# Patient Record
Sex: FEMALE | Race: WHITE | NOT HISPANIC OR LATINO | Employment: FULL TIME | URBAN - METROPOLITAN AREA
[De-identification: names, ages, dates, MRNs, and addresses within clinical notes are randomized per-mention and may not be internally consistent; named-entity substitution may affect disease eponyms.]

---

## 2017-02-15 ENCOUNTER — HOSPITAL ENCOUNTER (OUTPATIENT)
Dept: FAMILY MEDICINE CLINIC | Facility: CLINIC | Age: 52
Setting detail: SPECIMEN
Discharge: HOME | End: 2017-02-15
Attending: NURSE PRACTITIONER | Admitting: NURSE PRACTITIONER

## 2017-02-16 ENCOUNTER — HOSPITAL ENCOUNTER (OUTPATIENT)
Dept: FAMILY MEDICINE CLINIC | Facility: CLINIC | Age: 52
Setting detail: SPECIMEN
Discharge: HOME | End: 2017-02-16
Attending: NURSE PRACTITIONER | Admitting: NURSE PRACTITIONER

## 2017-02-16 LAB
ALBUMIN SERPL-MCNC: 3.8 G/DL (ref 3.5–4.8)
ALBUMIN/GLOB SERPL: 1.3 {RATIO} (ref 1–1.7)
ALP SERPL-CCNC: 86 IU/L (ref 32–91)
ALT SERPL-CCNC: 18 IU/L (ref 14–54)
ANION GAP SERPL CALC-SCNC: 15.5 MMOL/L (ref 10–20)
AST SERPL-CCNC: 18 IU/L (ref 15–41)
BASOPHILS # BLD AUTO: 0 10*3/UL (ref 0–0.2)
BASOPHILS NFR BLD AUTO: 0 % (ref 0–2)
BILIRUB SERPL-MCNC: 0.3 MG/DL (ref 0.3–1.2)
BUN SERPL-MCNC: 15 MG/DL (ref 8–20)
BUN/CREAT SERPL: 16.7 (ref 5.4–26.2)
CALCIUM SERPL-MCNC: 9.3 MG/DL (ref 8.9–10.3)
CHLORIDE SERPL-SCNC: 108 MMOL/L (ref 101–111)
CONV CO2: 25 MMOL/L (ref 22–32)
CONV TOTAL PROTEIN: 6.8 G/DL (ref 6.1–7.9)
CREAT UR-MCNC: 0.9 MG/DL (ref 0.4–1)
DIFFERENTIAL METHOD BLD: (no result)
EOSINOPHIL # BLD AUTO: 0.2 10*3/UL (ref 0–0.3)
EOSINOPHIL # BLD AUTO: 2 % (ref 0–3)
ERYTHROCYTE [DISTWIDTH] IN BLOOD BY AUTOMATED COUNT: 14.1 % (ref 11.5–14.5)
GLOBULIN UR ELPH-MCNC: 3 G/DL (ref 2.5–3.8)
GLUCOSE SERPL-MCNC: 98 MG/DL (ref 65–99)
HCT VFR BLD AUTO: 41.9 % (ref 35–49)
HGB BLD-MCNC: 14.2 G/DL (ref 12–15)
LYMPHOCYTES # BLD AUTO: 3.9 10*3/UL (ref 0.8–4.8)
LYMPHOCYTES NFR BLD AUTO: 35 % (ref 18–42)
MCH RBC QN AUTO: 31.2 PG (ref 26–32)
MCHC RBC AUTO-ENTMCNC: 33.9 G/DL (ref 32–36)
MCV RBC AUTO: 91.9 FL (ref 80–94)
MONOCYTES # BLD AUTO: 0.5 10*3/UL (ref 0.1–1.3)
MONOCYTES NFR BLD AUTO: 5 % (ref 2–11)
NEUTROPHILS # BLD AUTO: 6.5 10*3/UL (ref 2.3–8.6)
NEUTROPHILS NFR BLD AUTO: 58 % (ref 50–75)
NRBC BLD AUTO-RTO: 0 /100{WBCS}
NRBC/RBC NFR BLD MANUAL: 0 10*3/UL
PLATELET # BLD AUTO: 298 10*3/UL (ref 150–450)
PMV BLD AUTO: 8.3 FL (ref 7.4–10.4)
POTASSIUM SERPL-SCNC: 4.5 MMOL/L (ref 3.6–5.1)
RBC # BLD AUTO: 4.57 10*6/UL (ref 4–5.4)
SODIUM SERPL-SCNC: 144 MMOL/L (ref 136–144)
WBC # BLD AUTO: 11.1 10*3/UL (ref 4.5–11.5)

## 2017-05-16 ENCOUNTER — HOSPITAL ENCOUNTER (OUTPATIENT)
Dept: FAMILY MEDICINE CLINIC | Facility: CLINIC | Age: 52
Setting detail: SPECIMEN
Discharge: HOME | End: 2017-05-16
Attending: NURSE PRACTITIONER | Admitting: NURSE PRACTITIONER

## 2017-05-16 LAB
CHOLEST SERPL-MCNC: 131 MG/DL
CHOLEST/HDLC SERPL: 2.9 {RATIO}
CONV LDL CHOLESTEROL DIRECT: 72 MG/DL (ref 0–100)
HDLC SERPL-MCNC: 45 MG/DL
LDLC/HDLC SERPL: 1.6 {RATIO}
LIPID INTERPRETATION: NORMAL
TRIGL SERPL-MCNC: 56 MG/DL
VLDLC SERPL CALC-MCNC: 14.7 MG/DL

## 2017-09-29 ENCOUNTER — APPOINTMENT (OUTPATIENT)
Dept: GENERAL RADIOLOGY | Facility: HOSPITAL | Age: 52
End: 2017-09-29

## 2017-09-29 ENCOUNTER — HOSPITAL ENCOUNTER (INPATIENT)
Facility: HOSPITAL | Age: 52
LOS: 3 days | Discharge: HOME | End: 2017-10-02
Attending: FAMILY MEDICINE | Admitting: HOSPITALIST

## 2017-09-29 ENCOUNTER — APPOINTMENT (OUTPATIENT)
Dept: CT IMAGING | Facility: HOSPITAL | Age: 52
End: 2017-09-29

## 2017-09-29 ENCOUNTER — OFFICE VISIT (OUTPATIENT)
Dept: RETAIL CLINIC | Facility: CLINIC | Age: 52
End: 2017-09-29

## 2017-09-29 VITALS — TEMPERATURE: 98.8 F

## 2017-09-29 DIAGNOSIS — G35 MULTIPLE SCLEROSIS (HCC): Primary | ICD-10-CM

## 2017-09-29 DIAGNOSIS — R26.89 BALANCE PROBLEM: ICD-10-CM

## 2017-09-29 DIAGNOSIS — R27.0 ATAXIA: Primary | ICD-10-CM

## 2017-09-29 DIAGNOSIS — R13.12 OROPHARYNGEAL DYSPHAGIA: ICD-10-CM

## 2017-09-29 PROBLEM — R73.9 HYPERGLYCEMIA: Status: ACTIVE | Noted: 2017-09-29

## 2017-09-29 PROBLEM — G47.33 OSA (OBSTRUCTIVE SLEEP APNEA): Status: ACTIVE | Noted: 2017-09-29

## 2017-09-29 PROBLEM — E78.5 HYPERLIPIDEMIA: Status: ACTIVE | Noted: 2017-09-29

## 2017-09-29 LAB
ALBUMIN SERPL-MCNC: 4.5 G/DL (ref 3.5–5.2)
ALBUMIN/GLOB SERPL: 1.3 G/DL
ALP SERPL-CCNC: 85 U/L (ref 39–117)
ALT SERPL W P-5'-P-CCNC: 13 U/L (ref 1–33)
ANION GAP SERPL CALCULATED.3IONS-SCNC: 14.4 MMOL/L
AST SERPL-CCNC: 13 U/L (ref 1–32)
BACTERIA UR QL AUTO: ABNORMAL /HPF
BASOPHILS # BLD AUTO: 0.02 10*3/MM3 (ref 0–0.2)
BASOPHILS NFR BLD AUTO: 0.1 % (ref 0–1.5)
BILIRUB SERPL-MCNC: 0.5 MG/DL (ref 0.1–1.2)
BILIRUB UR QL STRIP: NEGATIVE
BUN BLD-MCNC: 7 MG/DL (ref 6–20)
BUN/CREAT SERPL: 8.9 (ref 7–25)
CALCIUM SPEC-SCNC: 9.9 MG/DL (ref 8.6–10.5)
CHLORIDE SERPL-SCNC: 102 MMOL/L (ref 98–107)
CLARITY UR: CLEAR
CO2 SERPL-SCNC: 23.6 MMOL/L (ref 22–29)
COLOR UR: YELLOW
CREAT BLD-MCNC: 0.79 MG/DL (ref 0.57–1)
DEPRECATED RDW RBC AUTO: 45.8 FL (ref 37–54)
EOSINOPHIL # BLD AUTO: 0.02 10*3/MM3 (ref 0–0.7)
EOSINOPHIL NFR BLD AUTO: 0.1 % (ref 0.3–6.2)
ERYTHROCYTE [DISTWIDTH] IN BLOOD BY AUTOMATED COUNT: 13.7 % (ref 11.7–13)
GFR SERPL CREATININE-BSD FRML MDRD: 76 ML/MIN/1.73
GLOBULIN UR ELPH-MCNC: 3.4 GM/DL
GLUCOSE BLD-MCNC: 138 MG/DL (ref 65–99)
GLUCOSE UR STRIP-MCNC: NEGATIVE MG/DL
HCT VFR BLD AUTO: 44.7 % (ref 35.6–45.5)
HGB BLD-MCNC: 15.2 G/DL (ref 11.9–15.5)
HGB UR QL STRIP.AUTO: ABNORMAL
HOLD SPECIMEN: NORMAL
HOLD SPECIMEN: NORMAL
HYALINE CASTS UR QL AUTO: ABNORMAL /LPF
IMM GRANULOCYTES # BLD: 0.03 10*3/MM3 (ref 0–0.03)
IMM GRANULOCYTES NFR BLD: 0.2 % (ref 0–0.5)
KETONES UR QL STRIP: NEGATIVE
LEUKOCYTE ESTERASE UR QL STRIP.AUTO: NEGATIVE
LYMPHOCYTES # BLD AUTO: 2.16 10*3/MM3 (ref 0.9–4.8)
LYMPHOCYTES NFR BLD AUTO: 15.9 % (ref 19.6–45.3)
MAGNESIUM SERPL-MCNC: 2.4 MG/DL (ref 1.6–2.6)
MCH RBC QN AUTO: 31.2 PG (ref 26.9–32)
MCHC RBC AUTO-ENTMCNC: 34 G/DL (ref 32.4–36.3)
MCV RBC AUTO: 91.8 FL (ref 80.5–98.2)
MONOCYTES # BLD AUTO: 0.31 10*3/MM3 (ref 0.2–1.2)
MONOCYTES NFR BLD AUTO: 2.3 % (ref 5–12)
NEUTROPHILS # BLD AUTO: 11.07 10*3/MM3 (ref 1.9–8.1)
NEUTROPHILS NFR BLD AUTO: 81.4 % (ref 42.7–76)
NITRITE UR QL STRIP: NEGATIVE
PH UR STRIP.AUTO: 5.5 [PH] (ref 5–8)
PLATELET # BLD AUTO: 326 10*3/MM3 (ref 140–500)
PMV BLD AUTO: 9.9 FL (ref 6–12)
POTASSIUM BLD-SCNC: 4.6 MMOL/L (ref 3.5–5.2)
PROT SERPL-MCNC: 7.9 G/DL (ref 6–8.5)
PROT UR QL STRIP: NEGATIVE
RBC # BLD AUTO: 4.87 10*6/MM3 (ref 3.9–5.2)
RBC # UR: ABNORMAL /HPF
REF LAB TEST METHOD: ABNORMAL
SODIUM BLD-SCNC: 140 MMOL/L (ref 136–145)
SP GR UR STRIP: 1.01 (ref 1–1.03)
SQUAMOUS #/AREA URNS HPF: ABNORMAL /HPF
TROPONIN T SERPL-MCNC: <0.01 NG/ML (ref 0–0.03)
UROBILINOGEN UR QL STRIP: ABNORMAL
WBC NRBC COR # BLD: 13.61 10*3/MM3 (ref 4.5–10.7)
WBC UR QL AUTO: ABNORMAL /HPF
WHOLE BLOOD HOLD SPECIMEN: NORMAL
WHOLE BLOOD HOLD SPECIMEN: NORMAL

## 2017-09-29 PROCEDURE — 83735 ASSAY OF MAGNESIUM: CPT | Performed by: FAMILY MEDICINE

## 2017-09-29 PROCEDURE — 36415 COLL VENOUS BLD VENIPUNCTURE: CPT | Performed by: FAMILY MEDICINE

## 2017-09-29 PROCEDURE — 93005 ELECTROCARDIOGRAM TRACING: CPT

## 2017-09-29 PROCEDURE — 80053 COMPREHEN METABOLIC PANEL: CPT | Performed by: FAMILY MEDICINE

## 2017-09-29 PROCEDURE — 93005 ELECTROCARDIOGRAM TRACING: CPT | Performed by: FAMILY MEDICINE

## 2017-09-29 PROCEDURE — 99213 OFFICE O/P EST LOW 20 MIN: CPT | Performed by: NURSE PRACTITIONER

## 2017-09-29 PROCEDURE — 99283 EMERGENCY DEPT VISIT LOW MDM: CPT

## 2017-09-29 PROCEDURE — 93010 ELECTROCARDIOGRAM REPORT: CPT | Performed by: INTERNAL MEDICINE

## 2017-09-29 PROCEDURE — 70450 CT HEAD/BRAIN W/O DYE: CPT

## 2017-09-29 PROCEDURE — 84484 ASSAY OF TROPONIN QUANT: CPT | Performed by: FAMILY MEDICINE

## 2017-09-29 PROCEDURE — 82607 VITAMIN B-12: CPT | Performed by: PSYCHIATRY & NEUROLOGY

## 2017-09-29 PROCEDURE — 81001 URINALYSIS AUTO W/SCOPE: CPT | Performed by: FAMILY MEDICINE

## 2017-09-29 PROCEDURE — 85025 COMPLETE CBC W/AUTO DIFF WBC: CPT | Performed by: FAMILY MEDICINE

## 2017-09-29 RX ORDER — ACETAMINOPHEN 325 MG/1
650 TABLET ORAL EVERY 4 HOURS PRN
Status: DISCONTINUED | OUTPATIENT
Start: 2017-09-29 | End: 2017-10-02 | Stop reason: HOSPADM

## 2017-09-29 RX ORDER — ASPIRIN 325 MG
325 TABLET ORAL ONCE
Status: DISCONTINUED | OUTPATIENT
Start: 2017-09-29 | End: 2017-09-30

## 2017-09-29 RX ORDER — NICOTINE POLACRILEX 4 MG
15 LOZENGE BUCCAL
Status: DISCONTINUED | OUTPATIENT
Start: 2017-09-29 | End: 2017-10-02 | Stop reason: HOSPADM

## 2017-09-29 RX ORDER — CHOLECALCIFEROL (VITAMIN D3) 125 MCG
500 CAPSULE ORAL DAILY
Status: DISCONTINUED | OUTPATIENT
Start: 2017-09-30 | End: 2017-10-02 | Stop reason: HOSPADM

## 2017-09-29 RX ORDER — MELATONIN
5000 DAILY
Status: DISCONTINUED | OUTPATIENT
Start: 2017-09-30 | End: 2017-10-02 | Stop reason: HOSPADM

## 2017-09-29 RX ORDER — DEXTROSE MONOHYDRATE 25 G/50ML
25 INJECTION, SOLUTION INTRAVENOUS
Status: DISCONTINUED | OUTPATIENT
Start: 2017-09-29 | End: 2017-10-02 | Stop reason: HOSPADM

## 2017-09-29 RX ORDER — ATORVASTATIN CALCIUM 80 MG/1
80 TABLET, FILM COATED ORAL NIGHTLY
Status: DISCONTINUED | OUTPATIENT
Start: 2017-09-30 | End: 2017-10-02 | Stop reason: HOSPADM

## 2017-09-29 RX ORDER — SODIUM CHLORIDE 0.9 % (FLUSH) 0.9 %
10 SYRINGE (ML) INJECTION AS NEEDED
Status: DISCONTINUED | OUTPATIENT
Start: 2017-09-29 | End: 2017-10-02 | Stop reason: HOSPADM

## 2017-09-29 RX ORDER — ONDANSETRON 4 MG/1
4 TABLET, FILM COATED ORAL EVERY 6 HOURS PRN
Status: DISCONTINUED | OUTPATIENT
Start: 2017-09-29 | End: 2017-10-02 | Stop reason: HOSPADM

## 2017-09-29 RX ORDER — ONDANSETRON 4 MG/1
4 TABLET, ORALLY DISINTEGRATING ORAL EVERY 6 HOURS PRN
Status: DISCONTINUED | OUTPATIENT
Start: 2017-09-29 | End: 2017-10-02 | Stop reason: HOSPADM

## 2017-09-29 RX ORDER — CHOLECALCIFEROL (VITAMIN D3) 125 MCG
500 CAPSULE ORAL DAILY
COMMUNITY

## 2017-09-29 RX ORDER — SODIUM CHLORIDE 9 MG/ML
75 INJECTION, SOLUTION INTRAVENOUS CONTINUOUS
Status: DISCONTINUED | OUTPATIENT
Start: 2017-09-30 | End: 2017-10-02 | Stop reason: HOSPADM

## 2017-09-29 RX ORDER — ASPIRIN 81 MG/1
81 TABLET, CHEWABLE ORAL DAILY
COMMUNITY
End: 2017-09-29 | Stop reason: CLARIF

## 2017-09-29 RX ORDER — ATORVASTATIN CALCIUM 10 MG/1
10 TABLET, FILM COATED ORAL NIGHTLY
COMMUNITY

## 2017-09-29 RX ORDER — SODIUM CHLORIDE 0.9 % (FLUSH) 0.9 %
1-10 SYRINGE (ML) INJECTION AS NEEDED
Status: DISCONTINUED | OUTPATIENT
Start: 2017-09-29 | End: 2017-10-02 | Stop reason: HOSPADM

## 2017-09-29 RX ORDER — ASPIRIN 325 MG
325 TABLET, DELAYED RELEASE (ENTERIC COATED) ORAL DAILY
Status: DISCONTINUED | OUTPATIENT
Start: 2017-09-30 | End: 2017-09-30

## 2017-09-29 RX ORDER — MELATONIN
5000 DAILY
COMMUNITY

## 2017-09-29 RX ORDER — ONDANSETRON 2 MG/ML
4 INJECTION INTRAMUSCULAR; INTRAVENOUS EVERY 6 HOURS PRN
Status: DISCONTINUED | OUTPATIENT
Start: 2017-09-29 | End: 2017-10-02 | Stop reason: HOSPADM

## 2017-09-29 RX ORDER — ASPIRIN 81 MG/1
81 TABLET ORAL DAILY
COMMUNITY

## 2017-09-29 NOTE — PROGRESS NOTES
Subjective   Patient ID: Maggie Goyal is a 52 y.o. female presents with   Chief Complaint   Patient presents with   • Nausea   • Dizziness       HPI Comments: 53 yo wf  Cc: PMH: MS x 2 years. She is followed by neuro but does not have f/u appt.    She began having sudden onset of feeling off balance and leaning to left side. She has assoc nausea, vomiting and diarrhea x 24h. No LOC or falls reported. Denies speech or vision problems. She has taken asa daily.  She is not kadeem food or fluids today.    Nausea   Associated symptoms include nausea. Pertinent negatives include no abdominal pain, fatigue, fever, headaches, numbness or weakness.   Dizziness   Associated symptoms include nausea. Pertinent negatives include no abdominal pain, fatigue, fever, headaches, numbness or weakness.       Allergies not on file    The following portions of the patient's history were reviewed and updated as appropriate: allergies, current medications, past family history, past medical history, past social history, past surgical history and problem list.      Review of Systems   Constitutional: Positive for activity change. Negative for fatigue and fever.   Eyes: Negative for visual disturbance.   Respiratory: Negative for shortness of breath.    Gastrointestinal: Positive for diarrhea and nausea. Negative for abdominal pain.   Neurological: Positive for dizziness. Negative for syncope, facial asymmetry, speech difficulty, weakness, light-headedness, numbness and headaches.       Objective     Vitals:    09/29/17 1240   Temp: 98.8 °F (37.1 °C)         Physical Exam   Constitutional: She is oriented to person, place, and time. She appears well-developed and well-nourished. No distress.   HENT:   Head: Normocephalic and atraumatic.   Eyes: Conjunctivae and EOM are normal. Pupils are equal, round, and reactive to light.   Neck: Normal range of motion. Neck supple.   Cardiovascular: Normal rate, regular rhythm, normal heart sounds and  intact distal pulses.    Pulmonary/Chest: Effort normal and breath sounds normal.   Abdominal: Soft. Bowel sounds are normal. She exhibits no distension. There is no tenderness.   Musculoskeletal: Normal range of motion.   Neurological: She is alert and oriented to person, place, and time. She has normal reflexes. She displays normal reflexes. No cranial nerve deficit. She exhibits normal muscle tone. Coordination (gait steady but Rhomberg positive . pt is unable to maintain posture and falls to left with repeated  attempts) abnormal.   Motor is 5/5 all extremities. Speech is clear.    Skin: Skin is warm and dry.   Psychiatric: She has a normal mood and affect. Her behavior is normal. Judgment and thought content normal.   Nursing note and vitals reviewed.        Maggie was seen today for nausea and dizziness.    Diagnoses and all orders for this visit:    Multiple sclerosis    I have advised her to proceed by private vehicle to ER for further eval of her symptoms.  She is stable to travel in the car of a friend.     Follow-up with Primary Care Physician in 24-48 hours if these symptoms worsen or fail to improve as anticipated.

## 2017-09-30 ENCOUNTER — APPOINTMENT (OUTPATIENT)
Dept: MRI IMAGING | Facility: HOSPITAL | Age: 52
End: 2017-09-30

## 2017-09-30 ENCOUNTER — APPOINTMENT (OUTPATIENT)
Dept: CARDIOLOGY | Facility: HOSPITAL | Age: 52
End: 2017-09-30
Attending: HOSPITALIST

## 2017-09-30 LAB
ANION GAP SERPL CALCULATED.3IONS-SCNC: 15.7 MMOL/L
BH CV ECHO MEAS - ACS: 1.9 CM
BH CV ECHO MEAS - AO MAX PG (FULL): 3 MMHG
BH CV ECHO MEAS - AO MAX PG: 6.5 MMHG
BH CV ECHO MEAS - AO MEAN PG (FULL): 1 MMHG
BH CV ECHO MEAS - AO MEAN PG: 3 MMHG
BH CV ECHO MEAS - AO ROOT AREA (BSA CORRECTED): 1.7
BH CV ECHO MEAS - AO ROOT AREA: 5.7 CM^2
BH CV ECHO MEAS - AO ROOT DIAM: 2.7 CM
BH CV ECHO MEAS - AO V2 MAX: 127 CM/SEC
BH CV ECHO MEAS - AO V2 MEAN: 82.7 CM/SEC
BH CV ECHO MEAS - AO V2 VTI: 31.7 CM
BH CV ECHO MEAS - AVA(I,A): 2 CM^2
BH CV ECHO MEAS - AVA(I,D): 2 CM^2
BH CV ECHO MEAS - AVA(V,A): 2.1 CM^2
BH CV ECHO MEAS - AVA(V,D): 2.1 CM^2
BH CV ECHO MEAS - BSA(HAYCOCK): 1.6 M^2
BH CV ECHO MEAS - BSA: 1.6 M^2
BH CV ECHO MEAS - BZI_BMI: 24.2 KILOGRAMS/M^2
BH CV ECHO MEAS - BZI_METRIC_HEIGHT: 154.9 CM
BH CV ECHO MEAS - BZI_METRIC_WEIGHT: 58.1 KG
BH CV ECHO MEAS - CONTRAST EF (2CH): 67.3 ML/M^2
BH CV ECHO MEAS - CONTRAST EF 4CH: 57.9 ML/M^2
BH CV ECHO MEAS - EDV(CUBED): 68.9 ML
BH CV ECHO MEAS - EDV(MOD-SP2): 52 ML
BH CV ECHO MEAS - EDV(MOD-SP4): 38 ML
BH CV ECHO MEAS - EDV(TEICH): 74.2 ML
BH CV ECHO MEAS - EF(CUBED): 79.9 %
BH CV ECHO MEAS - EF(MOD-SP2): 67.3 %
BH CV ECHO MEAS - EF(MOD-SP4): 57.9 %
BH CV ECHO MEAS - EF(TEICH): 72.8 %
BH CV ECHO MEAS - ESV(CUBED): 13.8 ML
BH CV ECHO MEAS - ESV(MOD-SP2): 17 ML
BH CV ECHO MEAS - ESV(MOD-SP4): 16 ML
BH CV ECHO MEAS - ESV(TEICH): 20.2 ML
BH CV ECHO MEAS - FS: 41.5 %
BH CV ECHO MEAS - IVS/LVPW: 1
BH CV ECHO MEAS - IVSD: 1.1 CM
BH CV ECHO MEAS - LAT PEAK E' VEL: 12 CM/SEC
BH CV ECHO MEAS - LV DIASTOLIC VOL/BSA (35-75): 24.3 ML/M^2
BH CV ECHO MEAS - LV MASS(C)D: 151.3 GRAMS
BH CV ECHO MEAS - LV MASS(C)DI: 96.8 GRAMS/M^2
BH CV ECHO MEAS - LV MAX PG: 3.4 MMHG
BH CV ECHO MEAS - LV MEAN PG: 2 MMHG
BH CV ECHO MEAS - LV SYSTOLIC VOL/BSA (12-30): 10.2 ML/M^2
BH CV ECHO MEAS - LV V1 MAX: 92.4 CM/SEC
BH CV ECHO MEAS - LV V1 MEAN: 59.9 CM/SEC
BH CV ECHO MEAS - LV V1 VTI: 22.6 CM
BH CV ECHO MEAS - LVIDD: 4.1 CM
BH CV ECHO MEAS - LVIDS: 2.4 CM
BH CV ECHO MEAS - LVLD AP2: 7.1 CM
BH CV ECHO MEAS - LVLD AP4: 6 CM
BH CV ECHO MEAS - LVLS AP2: 5.7 CM
BH CV ECHO MEAS - LVLS AP4: 5 CM
BH CV ECHO MEAS - LVOT AREA (M): 2.8 CM^2
BH CV ECHO MEAS - LVOT AREA: 2.8 CM^2
BH CV ECHO MEAS - LVOT DIAM: 1.9 CM
BH CV ECHO MEAS - LVPWD: 1.1 CM
BH CV ECHO MEAS - MED PEAK E' VEL: 9 CM/SEC
BH CV ECHO MEAS - MV A DUR: 0.11 SEC
BH CV ECHO MEAS - MV A MAX VEL: 94.1 CM/SEC
BH CV ECHO MEAS - MV DEC SLOPE: 475 CM/SEC^2
BH CV ECHO MEAS - MV DEC TIME: 0.22 SEC
BH CV ECHO MEAS - MV E MAX VEL: 98 CM/SEC
BH CV ECHO MEAS - MV E/A: 1
BH CV ECHO MEAS - MV MAX PG: 6.4 MMHG
BH CV ECHO MEAS - MV MEAN PG: 2 MMHG
BH CV ECHO MEAS - MV P1/2T MAX VEL: 134 CM/SEC
BH CV ECHO MEAS - MV P1/2T: 82.6 MSEC
BH CV ECHO MEAS - MV V2 MAX: 126 CM/SEC
BH CV ECHO MEAS - MV V2 MEAN: 59 CM/SEC
BH CV ECHO MEAS - MV V2 VTI: 40 CM
BH CV ECHO MEAS - MVA P1/2T LCG: 1.6 CM^2
BH CV ECHO MEAS - MVA(P1/2T): 2.7 CM^2
BH CV ECHO MEAS - MVA(VTI): 1.6 CM^2
BH CV ECHO MEAS - PA ACC TIME: 0.14 SEC
BH CV ECHO MEAS - PA MAX PG (FULL): 1.7 MMHG
BH CV ECHO MEAS - PA MAX PG: 3.6 MMHG
BH CV ECHO MEAS - PA PR(ACCEL): 14.2 MMHG
BH CV ECHO MEAS - PA V2 MAX: 94.5 CM/SEC
BH CV ECHO MEAS - PULM A REVS DUR: 0.13 SEC
BH CV ECHO MEAS - PULM A REVS VEL: 28.6 CM/SEC
BH CV ECHO MEAS - PULM DIAS VEL: 47.1 CM/SEC
BH CV ECHO MEAS - PULM S/D: 1.4
BH CV ECHO MEAS - PULM SYS VEL: 67.9 CM/SEC
BH CV ECHO MEAS - PVA(V,A): 3 CM^2
BH CV ECHO MEAS - PVA(V,D): 3 CM^2
BH CV ECHO MEAS - QP/QS: 1.1
BH CV ECHO MEAS - RAP SYSTOLE: 8 MMHG
BH CV ECHO MEAS - RV MAX PG: 1.9 MMHG
BH CV ECHO MEAS - RV MEAN PG: 1 MMHG
BH CV ECHO MEAS - RV V1 MAX: 68.7 CM/SEC
BH CV ECHO MEAS - RV V1 MEAN: 46.4 CM/SEC
BH CV ECHO MEAS - RV V1 VTI: 17.7 CM
BH CV ECHO MEAS - RVOT AREA: 4.2 CM^2
BH CV ECHO MEAS - RVOT DIAM: 2.3 CM
BH CV ECHO MEAS - RVSP: 32 MMHG
BH CV ECHO MEAS - SI(AO): 116.1 ML/M^2
BH CV ECHO MEAS - SI(CUBED): 35.3 ML/M^2
BH CV ECHO MEAS - SI(LVOT): 41 ML/M^2
BH CV ECHO MEAS - SI(MOD-SP2): 22.4 ML/M^2
BH CV ECHO MEAS - SI(MOD-SP4): 14.1 ML/M^2
BH CV ECHO MEAS - SI(TEICH): 34.6 ML/M^2
BH CV ECHO MEAS - SUP REN AO DIAM: 1.6 CM
BH CV ECHO MEAS - SV(AO): 181.5 ML
BH CV ECHO MEAS - SV(CUBED): 55.1 ML
BH CV ECHO MEAS - SV(LVOT): 64.1 ML
BH CV ECHO MEAS - SV(MOD-SP2): 35 ML
BH CV ECHO MEAS - SV(MOD-SP4): 22 ML
BH CV ECHO MEAS - SV(RVOT): 73.5 ML
BH CV ECHO MEAS - SV(TEICH): 54.1 ML
BH CV ECHO MEAS - TAPSE (>1.6): 2.2 CM2
BH CV ECHO MEAS - TR MAX VEL: 243 CM/SEC
BH CV VAS BP RIGHT ARM: NORMAL MMHG
BH CV XLRA - RV BASE: 2.7 CM
BH CV XLRA - TDI S': 12 CM/SEC
BILIRUB UR QL STRIP: NEGATIVE
BUN BLD-MCNC: 8 MG/DL (ref 6–20)
BUN/CREAT SERPL: 10.7 (ref 7–25)
CALCIUM SPEC-SCNC: 9.2 MG/DL (ref 8.6–10.5)
CHLORIDE SERPL-SCNC: 104 MMOL/L (ref 98–107)
CHOLEST SERPL-MCNC: 126 MG/DL (ref 0–200)
CLARITY UR: CLEAR
CO2 SERPL-SCNC: 23.3 MMOL/L (ref 22–29)
COLOR UR: YELLOW
CREAT BLD-MCNC: 0.75 MG/DL (ref 0.57–1)
DEPRECATED RDW RBC AUTO: 44.9 FL (ref 37–54)
E/E' RATIO: 9
ERYTHROCYTE [DISTWIDTH] IN BLOOD BY AUTOMATED COUNT: 13.5 % (ref 11.7–13)
GFR SERPL CREATININE-BSD FRML MDRD: 81 ML/MIN/1.73
GLUCOSE BLD-MCNC: 98 MG/DL (ref 65–99)
GLUCOSE BLDC GLUCOMTR-MCNC: 100 MG/DL (ref 70–130)
GLUCOSE BLDC GLUCOMTR-MCNC: 213 MG/DL (ref 70–130)
GLUCOSE BLDC GLUCOMTR-MCNC: 235 MG/DL (ref 70–130)
GLUCOSE UR STRIP-MCNC: ABNORMAL MG/DL
HBA1C MFR BLD: 5.78 % (ref 4.8–5.6)
HCT VFR BLD AUTO: 43.9 % (ref 35.6–45.5)
HDLC SERPL-MCNC: 43 MG/DL (ref 40–60)
HGB BLD-MCNC: 14.8 G/DL (ref 11.9–15.5)
HGB UR QL STRIP.AUTO: NEGATIVE
KETONES UR QL STRIP: NEGATIVE
LDLC SERPL CALC-MCNC: 61 MG/DL (ref 0–100)
LDLC/HDLC SERPL: 1.41 {RATIO}
LEFT ATRIUM VOLUME INDEX: 21 ML/M2
LEUKOCYTE ESTERASE UR QL STRIP.AUTO: NEGATIVE
MCH RBC QN AUTO: 30.7 PG (ref 26.9–32)
MCHC RBC AUTO-ENTMCNC: 33.7 G/DL (ref 32.4–36.3)
MCV RBC AUTO: 91.1 FL (ref 80.5–98.2)
NITRITE UR QL STRIP: NEGATIVE
PH UR STRIP.AUTO: 6 [PH] (ref 5–8)
PLATELET # BLD AUTO: 280 10*3/MM3 (ref 140–500)
PMV BLD AUTO: 9.7 FL (ref 6–12)
POTASSIUM BLD-SCNC: 4.1 MMOL/L (ref 3.5–5.2)
PROT UR QL STRIP: NEGATIVE
RBC # BLD AUTO: 4.82 10*6/MM3 (ref 3.9–5.2)
SODIUM BLD-SCNC: 143 MMOL/L (ref 136–145)
SP GR UR STRIP: 1.01 (ref 1–1.03)
TRIGL SERPL-MCNC: 111 MG/DL (ref 0–150)
TSH SERPL DL<=0.05 MIU/L-ACNC: 4.5 MIU/ML (ref 0.27–4.2)
UROBILINOGEN UR QL STRIP: ABNORMAL
VLDLC SERPL-MCNC: 22.2 MG/DL (ref 5–40)
WBC NRBC COR # BLD: 9.1 10*3/MM3 (ref 4.5–10.7)

## 2017-09-30 PROCEDURE — 70544 MR ANGIOGRAPHY HEAD W/O DYE: CPT

## 2017-09-30 PROCEDURE — 82962 GLUCOSE BLOOD TEST: CPT

## 2017-09-30 PROCEDURE — 81003 URINALYSIS AUTO W/O SCOPE: CPT | Performed by: PSYCHIATRY & NEUROLOGY

## 2017-09-30 PROCEDURE — 84443 ASSAY THYROID STIM HORMONE: CPT | Performed by: HOSPITALIST

## 2017-09-30 PROCEDURE — 97165 OT EVAL LOW COMPLEX 30 MIN: CPT

## 2017-09-30 PROCEDURE — 70549 MR ANGIOGRAPH NECK W/O&W/DYE: CPT

## 2017-09-30 PROCEDURE — 85027 COMPLETE CBC AUTOMATED: CPT | Performed by: PSYCHIATRY & NEUROLOGY

## 2017-09-30 PROCEDURE — 80061 LIPID PANEL: CPT | Performed by: HOSPITALIST

## 2017-09-30 PROCEDURE — 93306 TTE W/DOPPLER COMPLETE: CPT

## 2017-09-30 PROCEDURE — 25510000001 GADOBENATE DIMEGLUMINE 529 MG/ML SOLUTION: Performed by: INTERNAL MEDICINE

## 2017-09-30 PROCEDURE — 80048 BASIC METABOLIC PNL TOTAL CA: CPT | Performed by: HOSPITALIST

## 2017-09-30 PROCEDURE — 83036 HEMOGLOBIN GLYCOSYLATED A1C: CPT | Performed by: HOSPITALIST

## 2017-09-30 PROCEDURE — 25010000002 METHYLPREDNISOLONE: Performed by: HOSPITALIST

## 2017-09-30 PROCEDURE — A9577 INJ MULTIHANCE: HCPCS | Performed by: INTERNAL MEDICINE

## 2017-09-30 PROCEDURE — 93306 TTE W/DOPPLER COMPLETE: CPT | Performed by: INTERNAL MEDICINE

## 2017-09-30 PROCEDURE — 63710000001 INSULIN ASPART PER 5 UNITS: Performed by: HOSPITALIST

## 2017-09-30 PROCEDURE — 92610 EVALUATE SWALLOWING FUNCTION: CPT

## 2017-09-30 PROCEDURE — 99254 IP/OBS CNSLTJ NEW/EST MOD 60: CPT | Performed by: PSYCHIATRY & NEUROLOGY

## 2017-09-30 PROCEDURE — 70553 MRI BRAIN STEM W/O & W/DYE: CPT

## 2017-09-30 RX ORDER — CLOPIDOGREL BISULFATE 75 MG/1
75 TABLET ORAL DAILY
Status: DISCONTINUED | OUTPATIENT
Start: 2017-09-30 | End: 2017-10-02 | Stop reason: HOSPADM

## 2017-09-30 RX ADMIN — CLOPIDOGREL 75 MG: 75 TABLET, FILM COATED ORAL at 17:23

## 2017-09-30 RX ADMIN — ASPIRIN 325 MG: 325 TABLET, DELAYED RELEASE ORAL at 09:20

## 2017-09-30 RX ADMIN — INSULIN ASPART 4 UNITS: 100 INJECTION, SOLUTION INTRAVENOUS; SUBCUTANEOUS at 21:43

## 2017-09-30 RX ADMIN — ATORVASTATIN CALCIUM 80 MG: 80 TABLET, FILM COATED ORAL at 20:26

## 2017-09-30 RX ADMIN — METHYLPREDNISOLONE SODIUM SUCCINATE 1 G: 1 INJECTION, POWDER, FOR SOLUTION INTRAMUSCULAR; INTRAVENOUS at 09:20

## 2017-09-30 RX ADMIN — CYANOCOBALAMIN TAB 500 MCG 500 MCG: 500 TAB at 09:21

## 2017-09-30 RX ADMIN — GADOBENATE DIMEGLUMINE 11 ML: 529 INJECTION, SOLUTION INTRAVENOUS at 14:30

## 2017-09-30 RX ADMIN — SODIUM CHLORIDE 75 ML/HR: 9 INJECTION, SOLUTION INTRAVENOUS at 20:26

## 2017-09-30 RX ADMIN — VITAMIN D, TAB 1000IU (100/BT) 5000 UNITS: 25 TAB at 09:21

## 2017-09-30 RX ADMIN — SODIUM CHLORIDE 75 ML/HR: 9 INJECTION, SOLUTION INTRAVENOUS at 00:52

## 2017-09-30 RX ADMIN — INSULIN ASPART 4 UNITS: 100 INJECTION, SOLUTION INTRAVENOUS; SUBCUTANEOUS at 17:23

## 2017-10-01 LAB
ANION GAP SERPL CALCULATED.3IONS-SCNC: 12.6 MMOL/L
BASOPHILS # BLD AUTO: 0 10*3/MM3 (ref 0–0.2)
BASOPHILS NFR BLD AUTO: 0 % (ref 0–1.5)
BUN BLD-MCNC: 9 MG/DL (ref 6–20)
BUN/CREAT SERPL: 14.3 (ref 7–25)
CALCIUM SPEC-SCNC: 9.2 MG/DL (ref 8.6–10.5)
CHLORIDE SERPL-SCNC: 107 MMOL/L (ref 98–107)
CO2 SERPL-SCNC: 21.4 MMOL/L (ref 22–29)
CREAT BLD-MCNC: 0.63 MG/DL (ref 0.57–1)
DEPRECATED RDW RBC AUTO: 44.4 FL (ref 37–54)
DEPRECATED RDW RBC AUTO: 44.8 FL (ref 37–54)
EOSINOPHIL # BLD AUTO: 0 10*3/MM3 (ref 0–0.7)
EOSINOPHIL NFR BLD AUTO: 0 % (ref 0.3–6.2)
ERYTHROCYTE [DISTWIDTH] IN BLOOD BY AUTOMATED COUNT: 13.4 % (ref 11.7–13)
ERYTHROCYTE [DISTWIDTH] IN BLOOD BY AUTOMATED COUNT: 13.5 % (ref 11.7–13)
GFR SERPL CREATININE-BSD FRML MDRD: 99 ML/MIN/1.73
GLUCOSE BLD-MCNC: 145 MG/DL (ref 65–99)
GLUCOSE BLDC GLUCOMTR-MCNC: 128 MG/DL (ref 70–130)
GLUCOSE BLDC GLUCOMTR-MCNC: 193 MG/DL (ref 70–130)
GLUCOSE BLDC GLUCOMTR-MCNC: 198 MG/DL (ref 70–130)
GLUCOSE BLDC GLUCOMTR-MCNC: 255 MG/DL (ref 70–130)
HCT VFR BLD AUTO: 39.6 % (ref 35.6–45.5)
HCT VFR BLD AUTO: 40.2 % (ref 35.6–45.5)
HGB BLD-MCNC: 13.3 G/DL (ref 11.9–15.5)
HGB BLD-MCNC: 13.3 G/DL (ref 11.9–15.5)
IMM GRANULOCYTES # BLD: 0.04 10*3/MM3 (ref 0–0.03)
IMM GRANULOCYTES NFR BLD: 0.3 % (ref 0–0.5)
LYMPHOCYTES # BLD AUTO: 1.87 10*3/MM3 (ref 0.9–4.8)
LYMPHOCYTES NFR BLD AUTO: 11.8 % (ref 19.6–45.3)
MCH RBC QN AUTO: 30.5 PG (ref 26.9–32)
MCH RBC QN AUTO: 31 PG (ref 26.9–32)
MCHC RBC AUTO-ENTMCNC: 33.1 G/DL (ref 32.4–36.3)
MCHC RBC AUTO-ENTMCNC: 33.6 G/DL (ref 32.4–36.3)
MCV RBC AUTO: 92.2 FL (ref 80.5–98.2)
MCV RBC AUTO: 92.3 FL (ref 80.5–98.2)
MONOCYTES # BLD AUTO: 0.22 10*3/MM3 (ref 0.2–1.2)
MONOCYTES NFR BLD AUTO: 1.4 % (ref 5–12)
NEUTROPHILS # BLD AUTO: 13.72 10*3/MM3 (ref 1.9–8.1)
NEUTROPHILS NFR BLD AUTO: 86.5 % (ref 42.7–76)
PLATELET # BLD AUTO: 251 10*3/MM3 (ref 140–500)
PLATELET # BLD AUTO: 252 10*3/MM3 (ref 140–500)
PMV BLD AUTO: 9.9 FL (ref 6–12)
PMV BLD AUTO: 9.9 FL (ref 6–12)
POTASSIUM BLD-SCNC: 3.9 MMOL/L (ref 3.5–5.2)
RBC # BLD AUTO: 4.29 10*6/MM3 (ref 3.9–5.2)
RBC # BLD AUTO: 4.36 10*6/MM3 (ref 3.9–5.2)
SODIUM BLD-SCNC: 141 MMOL/L (ref 136–145)
T3FREE SERPL-MCNC: 1.78 PG/ML (ref 2–4.4)
T4 FREE SERPL-MCNC: 1.15 NG/DL (ref 0.93–1.7)
TSH SERPL DL<=0.05 MIU/L-ACNC: 1.33 MIU/ML (ref 0.27–4.2)
VIT B12 BLD-MCNC: 1350 PG/ML (ref 211–946)
WBC NRBC COR # BLD: 15.85 10*3/MM3 (ref 4.5–10.7)
WBC NRBC COR # BLD: 16.06 10*3/MM3 (ref 4.5–10.7)

## 2017-10-01 PROCEDURE — 85025 COMPLETE CBC W/AUTO DIFF WBC: CPT | Performed by: PSYCHIATRY & NEUROLOGY

## 2017-10-01 PROCEDURE — 85027 COMPLETE CBC AUTOMATED: CPT | Performed by: INTERNAL MEDICINE

## 2017-10-01 PROCEDURE — 84481 FREE ASSAY (FT-3): CPT | Performed by: INTERNAL MEDICINE

## 2017-10-01 PROCEDURE — 80048 BASIC METABOLIC PNL TOTAL CA: CPT | Performed by: INTERNAL MEDICINE

## 2017-10-01 PROCEDURE — 82962 GLUCOSE BLOOD TEST: CPT

## 2017-10-01 PROCEDURE — 97161 PT EVAL LOW COMPLEX 20 MIN: CPT

## 2017-10-01 PROCEDURE — 99231 SBSQ HOSP IP/OBS SF/LOW 25: CPT | Performed by: PSYCHIATRY & NEUROLOGY

## 2017-10-01 PROCEDURE — 25010000002 METHYLPREDNISOLONE: Performed by: INTERNAL MEDICINE

## 2017-10-01 PROCEDURE — 84439 ASSAY OF FREE THYROXINE: CPT | Performed by: INTERNAL MEDICINE

## 2017-10-01 PROCEDURE — 63710000001 INSULIN ASPART PER 5 UNITS: Performed by: HOSPITALIST

## 2017-10-01 PROCEDURE — 84443 ASSAY THYROID STIM HORMONE: CPT | Performed by: INTERNAL MEDICINE

## 2017-10-01 RX ADMIN — CYANOCOBALAMIN TAB 500 MCG 500 MCG: 500 TAB at 08:28

## 2017-10-01 RX ADMIN — METHYLPREDNISOLONE SODIUM SUCCINATE 1 G: 1 INJECTION, POWDER, FOR SOLUTION INTRAMUSCULAR; INTRAVENOUS at 11:52

## 2017-10-01 RX ADMIN — CLOPIDOGREL 75 MG: 75 TABLET, FILM COATED ORAL at 08:28

## 2017-10-01 RX ADMIN — SODIUM CHLORIDE 75 ML/HR: 9 INJECTION, SOLUTION INTRAVENOUS at 09:49

## 2017-10-01 RX ADMIN — ACETAMINOPHEN 650 MG: 325 TABLET ORAL at 21:51

## 2017-10-01 RX ADMIN — ACETAMINOPHEN 650 MG: 325 TABLET ORAL at 08:30

## 2017-10-01 RX ADMIN — INSULIN ASPART 6 UNITS: 100 INJECTION, SOLUTION INTRAVENOUS; SUBCUTANEOUS at 21:51

## 2017-10-01 RX ADMIN — ATORVASTATIN CALCIUM 80 MG: 80 TABLET, FILM COATED ORAL at 21:51

## 2017-10-01 RX ADMIN — INSULIN ASPART 2 UNITS: 100 INJECTION, SOLUTION INTRAVENOUS; SUBCUTANEOUS at 18:51

## 2017-10-01 RX ADMIN — VITAMIN D, TAB 1000IU (100/BT) 5000 UNITS: 25 TAB at 08:28

## 2017-10-01 NOTE — PLAN OF CARE
Problem: Patient Care Overview (Adult)  Goal: Plan of Care Review    10/01/17 0908   Coping/Psychosocial Response Interventions   Plan Of Care Reviewed With patient   Outcome Evaluation   Outcome Summary/Follow up Plan Pt is independent with functional mobility. No Acute care PT needs at this time. Decreased high level dynamic balance noted. Recommend outpatient PT referral upon discharge.

## 2017-10-01 NOTE — PROGRESS NOTES
Neurology Progress Note      Chief Complaint:  Gait dysfunction    Subjective     Subjective:   patient feels much better.  With the mild headache   otherwise stable.    Medications:  Current Facility-Administered Medications   Medication Dose Route Frequency Provider Last Rate Last Dose   • acetaminophen (TYLENOL) tablet 650 mg  650 mg Oral Q4H PRN Harjit Paula MD       • atorvastatin (LIPITOR) tablet 80 mg  80 mg Oral Nightly Harjit Paula MD   80 mg at 09/30/17 2026   • cholecalciferol (VITAMIN D3) tablet 5,000 Units  5,000 Units Oral Daily Harjit Paula MD   5,000 Units at 09/30/17 0921   • clopidogrel (PLAVIX) tablet 75 mg  75 mg Oral Daily Edvin Swan MD   75 mg at 09/30/17 1723   • dextrose (D50W) solution 25 g  25 g Intravenous Q15 Min PRN Harjit Paula MD       • dextrose (GLUTOSE) oral gel 15 g  15 g Oral Q15 Min PRN Harjit Paula MD       • glucagon (human recombinant) (GLUCAGEN DIAGNOSTIC) injection 1 mg  1 mg Subcutaneous Q15 Min PRN Harjit Paula MD       • insulin aspart (novoLOG) injection 0-9 Units  0-9 Units Subcutaneous 4x Daily With Meals & Nightly Harjit Paula MD   4 Units at 09/30/17 2143   • methylPREDNISolone sod succ 1 g/100 mL 0.9% NS VTB (mbp)  1,000 mg Intravenous Daily Gilberto Bentley MD       • ondansetron (ZOFRAN) tablet 4 mg  4 mg Oral Q6H PRN Harjit Paula MD        Or   • ondansetron ODT (ZOFRAN-ODT) disintegrating tablet 4 mg  4 mg Oral Q6H PRN Harjit Paula MD        Or   • ondansetron (ZOFRAN) injection 4 mg  4 mg Intravenous Q6H PRN Harjit Paula MD       • sodium chloride 0.9 % flush 1-10 mL  1-10 mL Intravenous PRN Harjit Paula MD       • sodium chloride 0.9 % flush 10 mL  10 mL Intravenous PRN John Bustamante MD       • sodium chloride 0.9 % infusion  75 mL/hr Intravenous Continuous Harjit Paula MD 75 mL/hr at 09/30/17 2026 75 mL/hr at 09/30/17 2026   • vitamin B-12 (CYANOCOBALAMIN) tablet  500 mcg  500 mcg Oral Daily Harjit Paula MD   500 mcg at 09/30/17 0921       Review of Systems:   -A 14 point review of systems is completed and is negative except  As above      Objective      Vital Signs  Temp:  [97.5 °F (36.4 °C)-98.3 °F (36.8 °C)] 97.7 °F (36.5 °C)  Heart Rate:  [65-79] 65  Resp:  [16-18] 18  BP: (128-147)/(64-84) 132/64    Physical Exam:    HEENT:  Neck supple  CVS:  Regular rate and rhythm.  No murmurs  Carotid Examination:  No bruits  Lungs:  Clear to auscultation  Abdomen:  Non-tender, Non-distended  Extremities:  No signs of peripheral edema    Neurologic Exam:    -Awake, Alert, Oriented X 3  -No word finding difficulties  -No aphasia  -No dysarthria  -Follows simple and complex commands    Cranial nerves II through XII intact.    Motor: (strength out of 5:  1= minimal movement, 2 = movement in plane of gravity, 3 = movement against gravity, 4 = movement against some resistance, 5 = full strength)    -Right Upper Ext: Proximal: 5 Distal: 5  -Left Upper Ext: Proximal: 5 Distal: 5    -Right Lower Ext: Proximal: 5 Distal: 5  -Left Lower Ext: Proximal: 5 Distal: 5    DTR:  3+ throughout in all four extremities    Sensory:  -Intact to light touch, pinprick, temperature, pain, and proprioception    Coordination/Gait:  -No ataxia     Results Review:    I reviewed the patient's new clinical results.        Assessment/Plan     Hospital Problem List    Principal Problem:    Ataxia  Active Problems:    MS (multiple sclerosis)    Hyperlipidemia    Hyperglycemia    ROBERT (obstructive sleep apnea)    Impression:  presents with Left-sided gait difficulty.     1.  Most likely acute exacerbation of MS:  - Intravenous methylprednisolone 1000 mg daily for 3 days without an oral taper .  This can be done either as in our outpatient.  - PT OT  - Highly recommend MS specialist follow-up as outpatient to initiate immunomodulators .  Currently patient is refusing to start any MS medications -patient has  capacity to make decisions .     2.  Possible posterior circulation ischemic stroke given the risk factors:  - Smoking cessation counseling done  - Plavix daily  - PTOT speech rehabilitation.  - Echo normal  - Telemetry   - DVT prophylaxis   - Statins .     3. At Discharge Secondary stroke prophylaxis: Ideal targets for stroke prevention would be Blood pressure < 130/80; B12 > 500 TSH in normal range and LDL < 70; HbA1c < 6.5 and smoking cessation if applicable.       will sign off.  Follow-up with  Primary Neurologyist as outpatient  For management of MS.   the reason patient is refusing MS medications is the cost.  Has spoken to the nurse to call the  to see whether any benefit in medication assistance.     Thank you for allowing us to participate in the care of this patient.      Edvin Swan MD  10/01/17  7:30 AM

## 2017-10-01 NOTE — THERAPY DISCHARGE NOTE
"Acute Care - Physical Therapy Initial Eval/Discharge  [facility]     Patient Name: Maggie Goyal  : 1965  MRN: [id number]  Today's Date: 10/1/2017   Onset of Illness/Injury or Date of Surgery Date: 17     Referring Physician: Sheree      Admit Date: 2017    Visit Dx:    ICD-10-CM ICD-9-CM   1. Ataxia R27.0 781.3   2. Oropharyngeal dysphagia R13.12 787.22   3. Balance problem R26.89 781.99     Patient Active Problem List   Diagnosis   • Multiple sclerosis   • Ataxia   • MS (multiple sclerosis)   • Hyperlipidemia   • Hyperglycemia   • ROBERT (obstructive sleep apnea)     Past Medical History:   Diagnosis Date   • Hyperlipidemia    • Multiple sclerosis      Past Surgical History:   Procedure Laterality Date   •  SECTION     • CHOLECYSTECTOMY     • HYSTERECTOMY            PT ASSESSMENT (last 72 hours)      PT Evaluation       10/01/17 0903 17 1600    Rehab Evaluation    Document Type discharge evaluation/summary  -MG evaluation  -LS    Subjective Information agree to therapy  -MG agree to therapy  -LS    Patient Effort, Rehab Treatment good  -MG     General Information    Referring Physician Sheree  -MG     General Observations pt up in room independently  -MG     Pertinent History Of Current Problem reports \"leaning left\" and loosing balance occasionally   -MG     Precautions/Limitations no known precautions/limitations  -MG     Prior Level of Function independent:  -MG     Equipment Currently Used at Home none  -MG     Plans/Goals Discussed With patient  -MG     Barriers to Rehab none identified  -MG     Living Environment    Lives With alone  -MG     Living Arrangements condominium  -MG     Stair Railings at Home none  -MG     Clinical Impression    Patient/Family Goals Statement return home   -MG     Criteria for Skilled Therapeutic Interventions Met no   no acute care needs, recommend outpatient PT  -MG     Pain Assessment    Pain Assessment No/denies pain  -MG     Cognitive " Assessment/Intervention    Current Cognitive/Communication Assessment functional  -MG functional  -LS    Orientation Status oriented x 4  -MG     Follows Commands/Answers Questions 100% of the time  -MG     Personal Safety WNL/WFL  -MG     ROM (Range of Motion)    General ROM no range of motion deficits identified  -MG     MMT (Manual Muscle Testing)    General MMT Assessment no strength deficits identified  -MG     Bed Mobility, Assessment/Treatment    Bed Mobility, Comment independent  -MG     Transfer Assessment/Treatment    Transfers, Sit-Stand [place] independent  -MG     Transfers, Stand-Sit [place] independent  -MG     Gait Assessment/Treatment    Gait, [place] Level independent  -MG     Gait, Assistive Device --   none  -MG     Gait, Distance (Feet) 150  -MG     Gait, Comment mild left lean occasionally. pt maintains balance on own   -MG     Positioning and Restraints    Pre-Treatment Position standing in room  -MG     Post Treatment Position chair  -MG       09/30/17 1500 09/30/17 [facility]    Rehab Evaluation    Document Type evaluation  -MR     Subjective Information agree to therapy  -MR     Evaluation Not Performed  patient unavailable for evaluation   cardio  -LH    Patient Effort, Rehab Treatment good  -MR     General Information    Patient Profile Review yes  -MR     Onset of Illness/Injury or Date of Surgery Date 09/29/17  -MR     General Observations pt supine in bed, no acute distress  -MR     Precautions/Limitations fall precautions  -MR     Prior Level of Function independent:  -MR     Equipment Currently Used at Home none  -MR     Plans/Goals Discussed With patient  -MR     Benefits Reviewed patient:  -MR     Living Environment    Lives With alone  -MR     Living Arrangements condominium   first floor  -MR     Pain Assessment    Pain Assessment No/denies pain  -MR     Cognitive Assessment/Intervention    Current Cognitive/Communication Assessment functional  -MR     Orientation Status  oriented x 4  -MR     Follows Commands/Answers Questions 100% of the time  -MR     ROM (Range of Motion)    General ROM Detail BUE AROM WFL  -MR     MMT (Manual Muscle Testing)    General MMT Assessment Detail BUE > or = 3+/5  -MR     Bed Mobility, Assessment/Treatment    Bed Mob, Supine to Sit, [place] independent  -MR     Bed Mob, Sit to Supine, [place] independent  -MR     Transfer Assessment/Treatment    Transfers, Sit-Stand [place] supervision required  -MR     Transfers, Stand-Sit [place] supervision required  -MR     Motor Skills/Interventions    Additional Documentation --   UE coordination intact  -MR     Positioning and Restraints    Pre-Treatment Position in bed  -MR     Post Treatment Position bed  -MR     In Bed supine;notified nsg;call light within reach;encouraged to call for assist;exit alarm on  -MR       09/29/17 3320       General Information    Equipment Currently Used at Home none  -CR     Living Environment    Lives With alone  -CR     Living Arrangements house  -CR     Home Accessibility no concerns  -CR     Stair Railings at Home none  -CR     Type of Financial/Environmental Concern none  -CR     Transportation Available car;family or friend will provide  -CR       User Key  (r) = Recorded By, (t) = Taken By, (c) = Cosigned By    Initials Name Provider Type    CARLOS Guan MS CCC-SLP Speech and Language Pathologist    LH Emma Rodriguez, PT Physical Therapist    MR Tanya Mims, OT Occupational Therapist    EVER Cerda, RN Registered Nurse    MG Megan Gosselin, PT Physical Therapist              PT Recommendation and Plan  Anticipated Discharge Disposition: home  PT Frequency: evaluation only  Plan of Care Review  Plan Of Care Reviewed With: patient  Outcome Summary/Follow up Plan: Pt is independent with functional mobility. No Acute care PT needs at this time. Decreased high level dynamic balance noted. Recommend outpatient PT referral upon discharge.                Outcome Measures       10/01/17 0900 09/30/17 1600       How much help from another person do you currently need...    Turning from your back to your side while in flat bed without using bedrails? 4  -MG      Moving from lying on back to sitting on the side of a flat bed without bedrails? 4  -MG      Moving to and from a bed to a chair (including a wheelchair)? 4  -MG      Standing up from a chair using your arms (e.g., wheelchair, bedside chair)? 4  -MG      Climbing 3-5 steps with a railing? 4  -MG      To walk in hospital room? 4  -MG      AM-PAC 6 Clicks Score 24  -MG      How much help from another is currently needed...    Putting on and taking off regular lower body clothing?  3  -MR     Bathing (including washing, rinsing, and drying)  3  -MR     Toileting (which includes using toilet bed pan or urinal)  3  -MR     Putting on and taking off regular upper body clothing  3  -MR     Taking care of personal grooming (such as brushing teeth)  4  -MR     Eating meals  4  -MR     Score  20  -MR     Functional Assessment    Outcome Measure Options AM-PAC 6 Clicks Basic Mobility (PT)  -MG AM-PAC 6 Clicks Daily Activity (OT)  -MR       User Key  (r) = Recorded By, (t) = Taken By, (c) = Cosigned By    Initials Name Provider Type    MR Tanya Mims, OT Occupational Therapist    MG Megan Gosselin, PT Physical Therapist           Time Calculation:         PT Charges       10/01/17 0909          Time Calculation    Start Time 0850  -MG      Stop Time 0859  -MG      Time Calculation (min) 9 min  -MG      PT Received On 10/01/17  -MG        User Key  (r) = Recorded By, (t) = Taken By, (c) = Cosigned By    Initials Name Provider Type    MG Megan Gosselin, PT Physical Therapist          Therapy Charges for Today     Code Description Service Date Service Provider Modifiers Qty    31209957356 HC PT EVAL LOW COMPLEXITY 1 10/1/2017 Megan Gosselin, PT GP 1          PT G-Codes  Outcome Measure Options: AM-PAC 6  Clicks Basic Mobility (PT)    PT Discharge Summary  Anticipated Discharge Disposition: home  Reason for Discharge: Independent  Discharge Destination: Home (Outpatient PT)    Megan Gosselin, ALICIA  10/1/2017

## 2017-10-01 NOTE — PROGRESS NOTES
Name: Maggie Goyal ADMIT: 2017   : 1965  PCP: Laith Burns Jr., MD    MRN: [id number] LOS: 2 days   AGE/SEX: 52 y.o. female  ROOM: [facility]/   Subjective   Subjective  Still with imbalance but reports feels it may be improving some. No CP SOA NVD. Has had IV solumedrol as outpatient before and then reports quite bad 'withdrawal symptoms'. When asked she stated severe headache and cramping pain peripherally.    Objective   Vital Signs  Temp:  [97.5 °F (36.4 °C)-98.3 °F (36.8 °C)] 97.7 °F (36.5 °C)  Heart Rate:  [65-79] 65  Resp:  [16-18] 18  BP: (128-147)/(64-84) 132/64  SpO2:  [96 %-99 %] 98 %  on   ;   O2 Device: room air  Body mass index is 25 kg/(m^2).    Physical Exam   Constitutional: She is oriented to person, place, and time. She appears well-developed and well-nourished. No distress.   HENT:   Head: Normocephalic and atraumatic.   Eyes: EOM are normal. Pupils are equal, round, and reactive to light.   Neck: Normal range of motion. Neck supple.   Cardiovascular: Normal rate, regular rhythm, normal heart sounds and intact distal pulses.    No murmur heard.  Pulmonary/Chest: Effort normal and breath sounds normal. She has no wheezes. She has no rales.   Abdominal: Soft. Bowel sounds are normal. She exhibits no distension. There is no tenderness.   Musculoskeletal: Normal range of motion. She exhibits no edema.   Neurological: She is alert and oriented to person, place, and time. No cranial nerve deficit.   Skin: Skin is warm and dry. She is not diaphoretic.   Psychiatric: She has a normal mood and affect. Her behavior is normal.   Nursing note and vitals reviewed.      Results Review:       I reviewed the patient's new clinical results. Reviewed imaging, agree with interpretation.    Results from last 7 days  Lab Units 10/01/17  0649 17  1307 17  1452   WBC 10*3/mm3 15.85*  16.06* 9.10 13.61*   HEMOGLOBIN g/dL 13.3  13.3 14.8 15.2   PLATELETS 10*3/mm3 251  252 280 932      Results from last 7 days  Lab Units 10/01/17  0649 09/30/17  0614 09/29/17  1452   SODIUM mmol/L 141 143 140   POTASSIUM mmol/L 3.9 4.1 4.6   CHLORIDE mmol/L 107 104 102   CO2 mmol/L 21.4* 23.3 23.6   BUN mg/dL 9 8 7   CREATININE mg/dL 0.63 0.75 0.79   GLUCOSE mg/dL 145* 98 138*   Estimated Creatinine Clearance: 83.3 mL/min (by C-G formula based on Cr of 0.63).  Results from last 7 days  Lab Units 10/01/17  0649 09/30/17  0614 09/29/17  1452   CALCIUM mg/dL 9.2 9.2 9.9   ALBUMIN g/dL  --   --  4.50   MAGNESIUM mg/dL  --   --  2.4         atorvastatin 80 mg Oral Nightly   cholecalciferol 5,000 Units Oral Daily   clopidogrel 75 mg Oral Daily   insulin aspart 0-9 Units Subcutaneous 4x Daily With Meals & Nightly   methylPREDNISolone sodium succinate 1,000 mg Intravenous Daily   vitamin B-12 500 mcg Oral Daily       sodium chloride 75 mL/hr Last Rate: 75 mL/hr (10/01/17 0949)   Diet Regular; Consistent Carbohydrate, Cardiac      Assessment/Plan   Assessment:     Active Hospital Problems (** Indicates Principal Problem)    Diagnosis Date Noted   • **Ataxia [R27.0] 09/29/2017   • MS (multiple sclerosis) [G35] 09/29/2017   • Hyperlipidemia [E78.5] 09/29/2017   • Hyperglycemia [R73.9] 09/29/2017   • ROBERT (obstructive sleep apnea) [G47.33] 09/29/2017      Resolved Hospital Problems    Diagnosis Date Noted Date Resolved   No resolved problems to display.       Plan:     - MS Exacerbation: 5 days of IV solumedrol scheduled (Day2). Will plan for solumedrol dose pack after because she has previously had symptoms worsen after acute course. Consult CCP to see if we can arrange this as outpatient. Neurology following.  - Possible Posterior CVA: Plavix, Statin.  - Hyperglycemia: Reactive, A1c 5.7. SSI while inpatient.  - Elevated TSH: Normal TSH and free T4 on repeat    Disposition  Home/possibly tomorrow.      Gilberto Bentley MD  [facility]  10/01/17  1:56 PM

## 2017-10-01 NOTE — PLAN OF CARE
Problem: Patient Care Overview (Adult)  Goal: Plan of Care Review  Outcome: Ongoing (interventions implemented as appropriate)    10/01/17 1459   Coping/Psychosocial Response Interventions   Plan Of Care Reviewed With patient   Patient Care Overview   Progress improving   Outcome Evaluation   Outcome Summary/Follow up Plan Pt A&Ox3. No complaints of pain. VSS. Up moving well. No ataxia reported or demonstrated. Will continue to monitor vitals and labs. Possible DC in the AM.       Goal: Adult Individualization and Mutuality  Outcome: Ongoing (interventions implemented as appropriate)  Goal: Discharge Needs Assessment  Outcome: Ongoing (interventions implemented as appropriate)    Problem: Fall Risk (Adult)  Goal: Absence of Falls  Outcome: Ongoing (interventions implemented as appropriate)    Problem: Diabetes, Type 2 (Adult)  Goal: Signs and Symptoms of Listed Potential Problems Will be Absent or Manageable (Diabetes, Type 2)  Outcome: Outcome(s) achieved Date Met:  10/01/17    Problem: Stroke (Ischemic) (Adult)  Goal: Signs and Symptoms of Listed Potential Problems Will be Absent or Manageable (Stroke)  Outcome: Outcome(s) achieved Date Met:  10/01/17

## 2017-10-02 ENCOUNTER — TRANSCRIBE ORDERS (OUTPATIENT)
Dept: ADMINISTRATIVE | Facility: HOSPITAL | Age: 52
End: 2017-10-02

## 2017-10-02 VITALS
OXYGEN SATURATION: 98 % | SYSTOLIC BLOOD PRESSURE: 141 MMHG | WEIGHT: 135 LBS | TEMPERATURE: 97.9 F | HEIGHT: 60 IN | BODY MASS INDEX: 26.5 KG/M2 | HEART RATE: 42 BPM | RESPIRATION RATE: 18 BRPM | DIASTOLIC BLOOD PRESSURE: 63 MMHG

## 2017-10-02 DIAGNOSIS — G35 MULTIPLE SCLEROSIS EXACERBATION (HCC): ICD-10-CM

## 2017-10-02 DIAGNOSIS — G35 MS (MULTIPLE SCLEROSIS) (HCC): Primary | ICD-10-CM

## 2017-10-02 LAB
ANION GAP SERPL CALCULATED.3IONS-SCNC: 12.4 MMOL/L
BUN BLD-MCNC: 12 MG/DL (ref 6–20)
BUN/CREAT SERPL: 16 (ref 7–25)
CALCIUM SPEC-SCNC: 9.2 MG/DL (ref 8.6–10.5)
CHLORIDE SERPL-SCNC: 108 MMOL/L (ref 98–107)
CO2 SERPL-SCNC: 22.6 MMOL/L (ref 22–29)
CREAT BLD-MCNC: 0.75 MG/DL (ref 0.57–1)
DEPRECATED RDW RBC AUTO: 47.7 FL (ref 37–54)
ERYTHROCYTE [DISTWIDTH] IN BLOOD BY AUTOMATED COUNT: 13.9 % (ref 11.7–13)
GFR SERPL CREATININE-BSD FRML MDRD: 81 ML/MIN/1.73
GLUCOSE BLD-MCNC: 179 MG/DL (ref 65–99)
GLUCOSE BLDC GLUCOMTR-MCNC: 184 MG/DL (ref 70–130)
GLUCOSE BLDC GLUCOMTR-MCNC: 272 MG/DL (ref 70–130)
HCT VFR BLD AUTO: 39.6 % (ref 35.6–45.5)
HGB BLD-MCNC: 12.9 G/DL (ref 11.9–15.5)
MCH RBC QN AUTO: 30.6 PG (ref 26.9–32)
MCHC RBC AUTO-ENTMCNC: 32.6 G/DL (ref 32.4–36.3)
MCV RBC AUTO: 94.1 FL (ref 80.5–98.2)
PLATELET # BLD AUTO: 255 10*3/MM3 (ref 140–500)
PMV BLD AUTO: 10.1 FL (ref 6–12)
POTASSIUM BLD-SCNC: 3.9 MMOL/L (ref 3.5–5.2)
RBC # BLD AUTO: 4.21 10*6/MM3 (ref 3.9–5.2)
SODIUM BLD-SCNC: 143 MMOL/L (ref 136–145)
WBC NRBC COR # BLD: 17.4 10*3/MM3 (ref 4.5–10.7)

## 2017-10-02 PROCEDURE — 25010000002 METHYLPREDNISOLONE: Performed by: INTERNAL MEDICINE

## 2017-10-02 PROCEDURE — 85027 COMPLETE CBC AUTOMATED: CPT | Performed by: INTERNAL MEDICINE

## 2017-10-02 PROCEDURE — 80048 BASIC METABOLIC PNL TOTAL CA: CPT | Performed by: INTERNAL MEDICINE

## 2017-10-02 PROCEDURE — 82962 GLUCOSE BLOOD TEST: CPT

## 2017-10-02 PROCEDURE — 63710000001 INSULIN ASPART PER 5 UNITS: Performed by: HOSPITALIST

## 2017-10-02 RX ORDER — PREDNISONE 10 MG/1
TABLET ORAL
Qty: 15 TABLET | Refills: 0 | Status: SHIPPED | OUTPATIENT
Start: 2017-10-05

## 2017-10-02 RX ORDER — CLOPIDOGREL BISULFATE 75 MG/1
75 TABLET ORAL DAILY
Qty: 30 TABLET | Refills: 0 | Status: SHIPPED | OUTPATIENT
Start: 2017-10-03

## 2017-10-02 RX ADMIN — CYANOCOBALAMIN TAB 500 MCG 500 MCG: 500 TAB at 08:55

## 2017-10-02 RX ADMIN — METHYLPREDNISOLONE SODIUM SUCCINATE 1 G: 1 INJECTION, POWDER, FOR SOLUTION INTRAMUSCULAR; INTRAVENOUS at 08:55

## 2017-10-02 RX ADMIN — INSULIN ASPART 2 UNITS: 100 INJECTION, SOLUTION INTRAVENOUS; SUBCUTANEOUS at 08:55

## 2017-10-02 RX ADMIN — SODIUM CHLORIDE 75 ML/HR: 9 INJECTION, SOLUTION INTRAVENOUS at 00:27

## 2017-10-02 RX ADMIN — VITAMIN D, TAB 1000IU (100/BT) 5000 UNITS: 25 TAB at 08:55

## 2017-10-02 RX ADMIN — CLOPIDOGREL 75 MG: 75 TABLET, FILM COATED ORAL at 08:55

## 2017-10-02 NOTE — DISCHARGE SUMMARY
Date of Admission: 9/29/2017  Date of Discharge:  10/2/2017  Primary Care Physician: Laith Burns Jr., MD     Discharge Diagnosis:  Active Hospital Problems (** Indicates Principal Problem)    Diagnosis Date Noted   • **MS (multiple sclerosis) [G35] 09/29/2017   • Ataxia [R27.0] 09/29/2017   • Hyperlipidemia [E78.5] 09/29/2017   • Hyperglycemia [R73.9] 09/29/2017   • ROBERT (obstructive sleep apnea) [G47.33] 09/29/2017      Resolved Hospital Problems    Diagnosis Date Noted Date Resolved   No resolved problems to display.       Presenting Problem/History of Present Illness:  Ataxia [R27.0]  Ataxia [R27.0]     Pleasant 52-year-old female was states that last evening she got up from a chair and was unable to walk a straight line.  She states she kept stumbling towards the left.  She did not notice any dizziness or lightheadedness just a sense of imbalance.  This is persistent right on through today.  It is very consistent that she continues to want to fall towards the left when she attempts to ambulate.  She states when she sits up she also has a tendency to lean to the left.  She feels fine when she is lying down.  No weakness, no paresthesias.  She states 2 or 3 years ago she had an episode of right facial numbness and on the workup she was told she had an MRI consistent with MS. She apparently did have a course of high-dose steroids at that time.   The numbness resolved and she has not been on any treatment since then.    She has also have significant vascular risk factors including smoking and hyperlipidemia and elevated blood sugars although she has not officially been labeled a diabetic.    Hospital Course:  The patient is a 52 y.o. female who presented with MS exacerbation and questionable CVA. Main symptom was ataxia and falling to the left. She was admitted, CT and MRI were obtained and Neurology consulted. She was started on 1g solumedrol iv daily and symptoms improved. MRI resulted with bilateral  periventricular lesions and area of demyelination v ischemia in her cristiano. Results below. Neurology recommended 5 days of IV steroid. Given her previous withdrawal symptoms, I have written for oral prednisone taper to begin after her last IV dose. She has 2 more doses to receive and she will get these at our outpatient infusion center. She will then need to see her home neurologist and consider transitioning care to MS specialist if needed for additional suppressive/immunomodulator therapy.    Her symptoms resolved and she ambulated well. She did have hyperglycemia, <300, while here. A1c was not elevated at 5.7. Se reports that she did not need insulin during her previous IV steroid course.    For her possible CVA, Neurology recommended Plavix in addition to low dose aspirin. She is already taking statin, increase dose as tolerated.    Exam Today:  Constitutional: She is oriented to person, place, and time. She appears well-developed and well-nourished. No distress.   HENT:   Head: Normocephalic and atraumatic.   Eyes: EOM are normal. Pupils are equal, round, and reactive to light.   Neck: Normal range of motion. Neck supple.   Cardiovascular: Normal rate, regular rhythm, normal heart sounds and intact distal pulses.    No murmur heard.  Pulmonary/Chest: Effort normal and breath sounds normal. She has no wheezes. She has no rales.   Abdominal: Soft. Bowel sounds are normal. She exhibits no distension. There is no tenderness.   Musculoskeletal: Normal range of motion. She exhibits no edema.   Neurological: She is alert and oriented to person, place, and time. No cranial nerve deficit.   Skin: Skin is warm and dry. She is not diaphoretic.   Psychiatric: She has a normal mood and affect. Her behavior is normal.    Procedures Performed:         Consults:   Consults     Date and Time Order Name Status Description    9/29/2017 8465 Inpatient Consult to Neurology Completed     9/29/2017 7979 CHEVY (on-call MD unless  specified) Completed            Discharge Disposition:  Home or Self Care    Discharge Medications:   Maggie Goyal   Home Medication Instructions LOKI:[id number]    Printed on:10/02/17 1148   Medication Information                      aspirin 81 MG EC tablet  Take 81 mg by mouth Daily.             atorvastatin (LIPITOR) 10 MG tablet  Take 10 mg by mouth Every Night.             cholecalciferol (VITAMIN D3) 1000 units tablet  Take 5,000 Units by mouth Daily.             clopidogrel (PLAVIX) 75 MG tablet  Take 1 tablet by mouth Daily.             methylPREDNISolone sod succ 1 g/100 mL 0.9% NS VTB (mbp)  Infuse 100 mL into a venous catheter Daily for 2 doses.             predniSONE (DELTASONE) 10 MG tablet  Take PO daily, 40mg x2days, then 20mg x2days, then 10mg x2 days             vitamin B-12 (CYANOCOBALAMIN) 500 MCG tablet  Take 500 mcg by mouth Daily.                 Discharge Diet:   Diet Instructions     Advance Diet As Tolerated                     Activity at Discharge:   Activity Instructions     Activity as Tolerated                     Follow-up Appointments:  Future Appointments  Date Time Provider Department Center   10/3/2017 1:30 PM ROOM 9 [facility]   10/4/2017 12:30 PM ROOM 8 [facility]     Additional Instructions for the Follow-ups that You Need to Schedule     Discharge Follow-up with PCP    As directed    Follow Up Details:  1-2 weeks       Discharge Follow-up with Specialty    As directed    Specialty:  Home Neurologist   Follow Up Details:  call for appointment                 Test Results Pending at Discharge:       Gilberto Bentley MD  10/02/17  11:46 AM    Time Spent on Discharge Activities: >30 minutes

## 2017-10-02 NOTE — PROGRESS NOTES
Discharge Planning Assessment  [facility]     Patient Name: Maggie Goyal  MRN: [id number]  Today's Date: 10/2/2017    Admit Date: 9/29/2017          Discharge Needs Assessment       10/02/17 1116    Living Environment    Lives With alone    Living Arrangements apartment    Home Accessibility no concerns    Stair Railings at Home none    Transportation Available car;family or friend will provide    Living Environment    Quality Of Family Relationships supportive    Able to Return to Prior Living Arrangements yes    Discharge Needs Assessment    Equipment Currently Used at Home none    Discharge Planning Comments Spoke with pt at bedside.  Facesheet info confirmed.  Pt lives alone in a 1st level alartment, is IADLs and can drive.  No hx of DME, HH or SNF. Received referral to arrange outpt solumedrol IV infusions for 2 more days.  Pt is in agreement with coming to [facility].  Call to [facility] to arrange.  Order faxed to [facility] and [facility].  Pt scheduled for tomorrow 10/3 @ 1330 and Wednesday 10/4 @ 1230.  Pt notified and is in agreement.              Discharge Plan       10/02/17 1138    Case Management/Social Work Plan    Plan Pt to be Dc'd home and will followup at [facility] for 2 days of IV solumedrol.                  Demographic Summary       10/02/17 1115    Referral Information    Admission Type inpatient    Arrived From home or self-care    Referral Source admission list    Reason For Consult discharge planning    Record Reviewed medical record    Primary Care Physician Information    Name Laith Burns Jr., MD            Functional Status       10/02/17 1116    Functional Status Prior    Ambulation 0-->independent    Transferring 0-->independent    Toileting 0-->independent    Bathing 0-->independent    Dressing 0-->independent    Eating 0-->independent    Communication 0-->understands/communicates without difficulty    Swallowing 0-->swallows foods/liquids without difficulty    IADL    Medications  independent    Meal Preparation independent    Housekeeping independent    Laundry independent    Shopping independent    Oral Care independent    Activity Tolerance    Current Activity Limitations none    Cognitive/Perceptual/Developmental    Current Mental Status/Cognitive Functioning no deficits noted                Mildred Chambers RN

## 2017-10-02 NOTE — PROGRESS NOTES
Case Management Discharge Note    Final Note: Pt Dc'd home.  She will receive IV solumedrol at [facility] for the next 2 days.                Discharge Codes: 01  Discharge to home

## 2017-10-02 NOTE — PLAN OF CARE
Problem: Patient Care Overview (Adult)  Goal: Plan of Care Review  Outcome: Ongoing (interventions implemented as appropriate)    10/02/17 0441   Coping/Psychosocial Response Interventions   Plan Of Care Reviewed With patient   Patient Care Overview   Progress progress toward functional goals is gradual   Outcome Evaluation   Outcome Summary/Follow up Plan VSS. Pt A&O x4. mild HA early in shift tx x1. no ataxia reported. meds given per order. possible D/C in AM. will continue to monitor.        Goal: Adult Individualization and Mutuality  Outcome: Ongoing (interventions implemented as appropriate)  Goal: Discharge Needs Assessment  Outcome: Ongoing (interventions implemented as appropriate)    Problem: Fall Risk (Adult)  Goal: Absence of Falls  Outcome: Ongoing (interventions implemented as appropriate)    Problem: Pain, Acute (Adult)  Goal: Identify Related Risk Factors and Signs and Symptoms  Outcome: Ongoing (interventions implemented as appropriate)  Goal: Acceptable Pain Control/Comfort Level  Outcome: Ongoing (interventions implemented as appropriate)

## 2017-10-02 NOTE — PROGRESS NOTES
Continued Stay Note  [facility]     Patient Name: Maggie Goyal  MRN: [id number]  Today's Date: 10/2/2017    Admit Date: 9/29/2017          Discharge Plan       10/02/17 1323    Case Management/Social Work Plan    Additional Comments CCP received referral that pt needs assistance with affording MS meds.  Discussed w/ pt who states that the cost is part of the problem, but she is also concerned about the side effects of the meds.   CCP gave pt information about financial assistance programs from [employer] and [employer]org.  Also gave list of drug discount websites including [employer].   Mildred Chambers RN

## 2017-10-03 ENCOUNTER — HOSPITAL ENCOUNTER (OUTPATIENT)
Dept: INFUSION THERAPY | Facility: HOSPITAL | Age: 52
Discharge: HOME | End: 2017-10-03
Attending: INTERNAL MEDICINE | Admitting: INTERNAL MEDICINE

## 2017-10-03 VITALS
DIASTOLIC BLOOD PRESSURE: 70 MMHG | SYSTOLIC BLOOD PRESSURE: 144 MMHG | OXYGEN SATURATION: 96 % | TEMPERATURE: 98 F | HEART RATE: 54 BPM | RESPIRATION RATE: 16 BRPM

## 2017-10-03 DIAGNOSIS — G35 MS (MULTIPLE SCLEROSIS) (HCC): ICD-10-CM

## 2017-10-03 DIAGNOSIS — R27.0 ATAXIA: ICD-10-CM

## 2017-10-03 PROCEDURE — 96365 THER/PROPH/DIAG IV INF INIT: CPT

## 2017-10-03 PROCEDURE — 25010000002 METHYLPREDNISOLONE: Performed by: INTERNAL MEDICINE

## 2017-10-03 RX ADMIN — METHYLPREDNISOLONE SODIUM SUCCINATE 1 G: 1 INJECTION, POWDER, FOR SOLUTION INTRAMUSCULAR; INTRAVENOUS at 14:12

## 2017-10-03 NOTE — PROGRESS NOTES
Patient tolerated infusion without difficulty. Po diet pepsi per request. Patient dc'd home amb with father after appointment completed.

## 2017-10-04 ENCOUNTER — HOSPITAL ENCOUNTER (OUTPATIENT)
Dept: INFUSION THERAPY | Facility: HOSPITAL | Age: 52
Discharge: HOME | End: 2017-10-04
Attending: INTERNAL MEDICINE | Admitting: INTERNAL MEDICINE

## 2017-10-04 VITALS
SYSTOLIC BLOOD PRESSURE: 157 MMHG | DIASTOLIC BLOOD PRESSURE: 86 MMHG | OXYGEN SATURATION: 99 % | TEMPERATURE: 98 F | HEART RATE: 60 BPM | RESPIRATION RATE: 20 BRPM

## 2017-10-04 DIAGNOSIS — R27.0 ATAXIA: ICD-10-CM

## 2017-10-04 DIAGNOSIS — G35 MS (MULTIPLE SCLEROSIS) (HCC): ICD-10-CM

## 2017-10-04 PROCEDURE — 96365 THER/PROPH/DIAG IV INF INIT: CPT

## 2017-10-04 PROCEDURE — 25010000002 METHYLPREDNISOLONE: Performed by: INTERNAL MEDICINE

## 2017-10-04 RX ADMIN — METHYLPREDNISOLONE SODIUM SUCCINATE 1 G: 1 INJECTION, POWDER, FOR SOLUTION INTRAMUSCULAR; INTRAVENOUS at 12:10

## 2017-11-16 ENCOUNTER — HOSPITAL ENCOUNTER (OUTPATIENT)
Dept: LAB | Facility: HOSPITAL | Age: 52
Discharge: HOME | End: 2017-11-16
Attending: NURSE PRACTITIONER | Admitting: NURSE PRACTITIONER

## 2017-11-16 LAB
BASOPHILS # BLD AUTO: 0.1 10*3/UL (ref 0–0.2)
BASOPHILS NFR BLD AUTO: 1 % (ref 0–2)
CHOLEST SERPL-MCNC: 144 MG/DL
CHOLEST/HDLC SERPL: 2.9 {RATIO}
CONV LDL CHOLESTEROL DIRECT: 79 MG/DL (ref 0–100)
DIFFERENTIAL METHOD BLD: (no result)
EOSINOPHIL # BLD AUTO: 0.1 10*3/UL (ref 0–0.3)
EOSINOPHIL # BLD AUTO: 1 % (ref 0–3)
ERYTHROCYTE [DISTWIDTH] IN BLOOD BY AUTOMATED COUNT: 13.9 % (ref 11.5–14.5)
HCT VFR BLD AUTO: 44.6 % (ref 35–49)
HDLC SERPL-MCNC: 49 MG/DL
HGB BLD-MCNC: 14.9 G/DL (ref 12–15)
LDLC/HDLC SERPL: 1.6 {RATIO}
LIPID INTERPRETATION: NORMAL
LYMPHOCYTES # BLD AUTO: 3 10*3/UL (ref 0.8–4.8)
LYMPHOCYTES NFR BLD AUTO: 33 % (ref 18–42)
MCH RBC QN AUTO: 30.6 PG (ref 26–32)
MCHC RBC AUTO-ENTMCNC: 33.4 G/DL (ref 32–36)
MCV RBC AUTO: 91.6 FL (ref 80–94)
MONOCYTES # BLD AUTO: 0.5 10*3/UL (ref 0.1–1.3)
MONOCYTES NFR BLD AUTO: 6 % (ref 2–11)
NEUTROPHILS # BLD AUTO: 5.4 10*3/UL (ref 2.3–8.6)
NEUTROPHILS NFR BLD AUTO: 59 % (ref 50–75)
NRBC BLD AUTO-RTO: 0 /100{WBCS}
NRBC/RBC NFR BLD MANUAL: 0 10*3/UL
PLATELET # BLD AUTO: 273 10*3/UL (ref 150–450)
PMV BLD AUTO: 7.3 FL (ref 7.4–10.4)
RBC # BLD AUTO: 4.87 10*6/UL (ref 4–5.4)
TRIGL SERPL-MCNC: 113 MG/DL
VIT B12 SERPL-MCNC: 1471 PG/ML (ref 180–914)
VLDLC SERPL CALC-MCNC: 15.9 MG/DL
WBC # BLD AUTO: 9.1 10*3/UL (ref 4.5–11.5)

## 2021-03-26 ENCOUNTER — BULK ORDERING (OUTPATIENT)
Dept: CASE MANAGEMENT | Facility: OTHER | Age: 56
End: 2021-03-26

## 2021-03-26 DIAGNOSIS — Z23 IMMUNIZATION DUE: ICD-10-CM

## 2025-06-27 NOTE — PLAN OF CARE
Behavioral Health Belongings     Informed of Valuables Policy  Yes No       Item                Qty   Clothing:     Room   Bin (Lock up)   Security   7 Pants /shorts/pajama pants 1 6    7 Shirts/tank tops 2 5    1 Shoes / boots with strings  x     Coats / jackets with strings       Socks      3 Underwear  1 2     Bras                      Behavioral Health Belongings List                     [id number]                 Qty   Grooming / Hygiene Items:   Room   Bin (Lock up)   TO SEND HOME    Earrings        hairbrush       Contacts        Eye glasses        Feminine hygiene       Glasses with container        Hair ties        Rubber bands        Retainer       1 Conditioner hair   x   1    1    1 Lotion    Hair gel    [place]                 X    X      x                               Behavioral Health Belongings List                            [id number]                    Qty   Other Misc Items   Room   Bin (Lock up)   TO SEND HOME   1 bible   x   1 backpack   x   1 phone   x   1     x   4 socks   x   3 Tank tops   x            Signature at Admission _____________tesa tramuta______________________________Date: __6/27/25_______    Signature at Update ______________________________________________Date: _________    Signature at Discharge ____________________________________________Date: _________                                                        Behavioral Health Belongings List                       [id number]          Problem: Patient Care Overview (Adult)  Goal: Plan of Care Review  Outcome: Ongoing (interventions implemented as appropriate)    10/01/17 0835   Coping/Psychosocial Response Interventions   Plan Of Care Reviewed With patient   Patient Care Overview   Progress no change   Outcome Evaluation   Outcome Summary/Follow up Plan Patients vitals stable. Patient denies pain and discomfort. Patient remains independent going to bathroom and tolerating fluids well. Will continue to monitor.         Problem: Fall Risk (Adult)  Goal: Identify Related Risk Factors and Signs and Symptoms  Outcome: Outcome(s) achieved Date Met:  10/01/17  Goal: Absence of Falls  Outcome: Ongoing (interventions implemented as appropriate)    Problem: Diabetes, Type 2 (Adult)  Goal: Signs and Symptoms of Listed Potential Problems Will be Absent or Manageable (Diabetes, Type 2)  Outcome: Ongoing (interventions implemented as appropriate)